# Patient Record
Sex: MALE | Race: WHITE | Employment: OTHER
[De-identification: names, ages, dates, MRNs, and addresses within clinical notes are randomized per-mention and may not be internally consistent; named-entity substitution may affect disease eponyms.]

---

## 2017-06-03 DIAGNOSIS — J45.20 INTERMITTENT ASTHMA, UNCOMPLICATED: ICD-10-CM

## 2017-06-05 NOTE — TELEPHONE ENCOUNTER
Routing refill request to provider for review/approval because:  Act out of date    Aure Becerra RN  [facility]  [phone]

## 2017-06-05 NOTE — TELEPHONE ENCOUNTER
Routing to team to inform and assist in scheduling.   Blanca Alcaraz RN   [facility] - Triage

## 2017-06-05 NOTE — TELEPHONE ENCOUNTER
Qvar 80 mcg inhaler        Last Written Prescription Date: 12/29/16  Last Fill Quantity: 2, # refills: 1    Last Office Visit with G, P or [facility] prescribing provider:  1/7/16   Future Office Visit:       Date of Last Asthma Action Plan Letter:   Asthma Action Plan Q1 Year    Topic Date Due     Asthma Action Plan - yearly  10/23/2015      Asthma Control Test:   ACT Total Scores 1/7/2016   ACT TOTAL SCORE -   ASTHMA ER VISITS -   ASTHMA HOSPITALIZATIONS -   ACT TOTAL SCORE (Goal Greater than or Equal to 20) 21   In the past 12 months, how many times did you visit the emergency room for your asthma without being admitted to the hospital? 0   In the past 12 months, how many times were you hospitalized overnight because of your asthma? 0       Date of Last Spirometry Test:   No results found for this or any previous visit.

## 2017-06-07 NOTE — TELEPHONE ENCOUNTER
Pt has moved out of state, [place], will no longer be coming to [facility] .Jd CONTE, CMA

## 2017-08-31 DIAGNOSIS — J45.20 INTERMITTENT ASTHMA, UNCOMPLICATED: ICD-10-CM

## 2017-08-31 NOTE — TELEPHONE ENCOUNTER
Qvar      Last Written Prescription Date: 6/5/17  Last Fill Quantity: 17.4,  # refills: 0   Last Office Visit with G, [facility] or [facility] prescribing provider: 1/7/16    Zari Mcneal CMA

## 2017-08-31 NOTE — TELEPHONE ENCOUNTER
Routing refill request to provider for review/approval because:  Toya given x1 and patient did not follow up, please advise  Attempted to contact the patient to schedule. No answer.  Routing to PCP and TC.  Miladis Cordon RN

## 2017-09-01 NOTE — TELEPHONE ENCOUNTER
RF ordered for 1 month. Call patient to make appointment to come in for OV.     Kiko Hwang MD  [facility], [place]

## 2017-11-11 DIAGNOSIS — J45.20 INTERMITTENT ASTHMA, UNCOMPLICATED: ICD-10-CM

## 2017-11-13 NOTE — TELEPHONE ENCOUNTER
Last ACT 1/7/16. LOV 1/7/16 Last refill 6/5/17  Routing refill request to provider for review/approval because:  Toya given x1 and patient did not follow up, please advise  Miladis Cordon RN

## 2017-11-28 DIAGNOSIS — J45.20 INTERMITTENT ASTHMA, UNCOMPLICATED: ICD-10-CM

## 2017-11-29 NOTE — TELEPHONE ENCOUNTER
Spoke to pt, he now resides in [place] and has established care out there.  Please remove medication.  Jd CONTE CMA

## 2017-11-29 NOTE — TELEPHONE ENCOUNTER
Qvar      Last Written Prescription Date: 8/31/17  Last Fill Quantity: 17.4,  # refills: 0   Last Office Visit with [facility], P or [facility] prescribing provider: 1/7/16                                           Requested Prescriptions   Pending Prescriptions Disp Refills     QVAR 80 MCG/ACT Inhaler [Pharmacy Med Name: QVAR 80 MCG ORAL INHALER] 17.4 g 1     Sig: INHALE 1 PUFF INTO THE LUNGS 2 TIMES DAILY    Inhaled Steroids Protocol Failed    11/28/2017 11:20 AM       Failed - Asthma control test 20 or greater in last 6 months    Please review ACT score.          Failed - Recent (6 mo) or future visit with authorizing provider's specialty    Patient had office visit in the last 6 months or has a visit in the next 30 days with authorizing provider.  See chart review.            Passed - Patient is age 12 or older        PHQ-9 SCORE 5/13/2013   Total Score 2     ACT Total Scores 1/7/2016   ACT TOTAL SCORE -   ASTHMA ER VISITS -   ASTHMA HOSPITALIZATIONS -   ACT TOTAL SCORE (Goal Greater than or Equal to 20) 21   In the past 12 months, how many times did you visit the emergency room for your asthma without being admitted to the hospital? 0   In the past 12 months, how many times were you hospitalized overnight because of your asthma? 0

## 2019-09-30 ENCOUNTER — HEALTH MAINTENANCE LETTER (OUTPATIENT)
Age: 64
End: 2019-09-30

## 2021-01-15 ENCOUNTER — HEALTH MAINTENANCE LETTER (OUTPATIENT)
Age: 66
End: 2021-01-15

## 2022-02-13 ENCOUNTER — HEALTH MAINTENANCE LETTER (OUTPATIENT)
Age: 67
End: 2022-02-13

## 2023-03-26 ENCOUNTER — HEALTH MAINTENANCE LETTER (OUTPATIENT)
Age: 68
End: 2023-03-26